# Patient Record
Sex: FEMALE | URBAN - METROPOLITAN AREA
[De-identification: names, ages, dates, MRNs, and addresses within clinical notes are randomized per-mention and may not be internally consistent; named-entity substitution may affect disease eponyms.]

---

## 2023-02-14 ENCOUNTER — WEB ENCOUNTER (OUTPATIENT)
Dept: URBAN - METROPOLITAN AREA CLINIC 12 | Facility: CLINIC | Age: 33
End: 2023-02-14

## 2023-02-16 ENCOUNTER — DASHBOARD ENCOUNTERS (OUTPATIENT)
Age: 33
End: 2023-02-16

## 2023-02-16 ENCOUNTER — OFFICE VISIT (OUTPATIENT)
Dept: URBAN - METROPOLITAN AREA CLINIC 12 | Facility: CLINIC | Age: 33
End: 2023-02-16
Payer: COMMERCIAL

## 2023-02-16 VITALS
SYSTOLIC BLOOD PRESSURE: 126 MMHG | HEART RATE: 77 BPM | BODY MASS INDEX: 30.58 KG/M2 | HEIGHT: 63 IN | DIASTOLIC BLOOD PRESSURE: 77 MMHG | WEIGHT: 172.6 LBS

## 2023-02-16 DIAGNOSIS — R19.7 DIARRHEA: ICD-10-CM

## 2023-02-16 DIAGNOSIS — K60.2 ANAL FISSURE: ICD-10-CM

## 2023-02-16 DIAGNOSIS — R10.33 PERIUMBILICAL ABDOMINAL PAIN: ICD-10-CM

## 2023-02-16 PROCEDURE — 99204 OFFICE O/P NEW MOD 45 MIN: CPT | Performed by: INTERNAL MEDICINE

## 2023-02-16 RX ORDER — LEVOTHYROXINE SODIUM 75 UG/1
1 TABLET IN THE MORNING ON AN EMPTY STOMACH TABLET ORAL ONCE A DAY
Status: ACTIVE | COMMUNITY

## 2023-02-16 NOTE — PHYSICAL EXAM GASTROINTESTINAL
Abdomen , soft, nontender, nondistended , no guarding or rigidity , no masses palpable , normal bowel sounds , Liver and Spleen,  no hepatosplenomegaly , liver nontender rectal exam revealed anterior wall fissure

## 2023-02-16 NOTE — HPI-TODAY'S VISIT:
32-year-old female presented today for evaluation of recurrent cramping diarrhea perianal fissure.  Patient has the symptoms for years recently has increased in frequency she has recurrent perianal fissure.  When she was 16 years old had extensive GI work-up as a child in [facility] she had EGD colonoscopy capsule endoscopy was unremarkable she has positive ASCA serology.  In 2021 after her childbirth had recurrent diarrhea abdominal pain.  Concerned about Crohn disease.  No blood in the stool she has nonhealing recurrent anal fissure which is exacerbated recently.

## 2023-02-17 ENCOUNTER — LAB OUTSIDE AN ENCOUNTER (OUTPATIENT)
Dept: URBAN - METROPOLITAN AREA CLINIC 23 | Facility: CLINIC | Age: 33
End: 2023-02-17

## 2023-02-20 LAB
ABSOLUTE BASOPHILS: 57
ABSOLUTE EOSINOPHILS: 98
ABSOLUTE LYMPHOCYTES: 2050
ABSOLUTE MONOCYTES: 508
ABSOLUTE NEUTROPHILS: 5486
ALMOND (F20) IGE: <0.1
BASOPHILS: 0.7
C-REACTIVE PROTEIN, QUANT: 1
CASHEW NUT (F202) IGE: <0.1
CLASS: 0
CODFISH (F3) IGE: <0.1
COW'S MILK (F2) IGE: <0.1
EGG WHITE (F1) IGE: <0.1
EOSINOPHILS: 1.2
FERRITIN, SERUM: 28
HAZELNUT (F17) IGE: <0.1
HEMATOCRIT: 38.8
HEMOGLOBIN: 13.3
IMMUNOGLOBULIN A: 84
INTERPRETATION: (no result)
INTERPRETATION: (no result)
IRON BIND.CAP.(TIBC): 303
IRON SATURATION: 26
IRON: 80
LYMPHOCYTES: 25
MCH: 30
MCHC: 34.3
MCV: 87.6
MONOCYTES: 6.2
MPV: 10.4
NEUTROPHILS: 66.9
PEANUT (F13) IGE: <0.1
PLATELET COUNT: 244
RDW: 12
RED BLOOD CELL COUNT: 4.43
SALMON (F41) IGE: <0.1
SCALLOP (F338) IGE: <0.1
SESAME SEED (F10) IGE: <0.1
SHRIMP (F24) IGE: <0.1
SOYBEAN (F14) IGE: <0.1
TISSUE TRANSGLUTAMINASE AB, IGA: <1
TUNA (F40) IGE: <0.1
VITAMIN B12: 332
VITAMIN D,25-OH,TOTAL,IA: 37
WALNUT (F256) IGE: <0.1
WHEAT (F4) IGE: <0.1
WHITE BLOOD CELL COUNT: 8.2

## 2023-02-21 ENCOUNTER — WEB ENCOUNTER (OUTPATIENT)
Dept: URBAN - METROPOLITAN AREA CLINIC 23 | Facility: CLINIC | Age: 33
End: 2023-02-21

## 2023-02-21 LAB
ADENOVIRUS F 40/41: NOT DETECTED
CALPROTECTIN, STOOL - QDX: (no result)
CAMPYLOBACTER: NOT DETECTED
CLOSTRIDIUM DIFFICILE: NOT DETECTED
CRYPTOSPORIDIUM: NOT DETECTED
CYCLOSPORA CAYETANESIS: NOT DETECTED
ENTAMOEBA HISTOLYTICA: NOT DETECTED
ENTAMOEBA HISTOLYTICA: NOT DETECTED
ENTEROAGGREGATIVE E.COLI: NOT DETECTED
ENTEROTOXIGENIC E.COLI: NOT DETECTED
ESCHERICHIA COLI O157: NOT DETECTED
GIARDIA LAMBIA: NOT DETECTED
NOROVIRUS GI/GII: NOT DETECTED
NOROVIRUS GI/GII: NOT DETECTED
ROTAVIRUS A: NOT DETECTED
SHIGA-LIKE TOXIN PRODUCING E.COLI: NOT DETECTED
SHIGELLA SPP. / ENTEROINVASIVE E.COLI: NOT DETECTED
VIBRIO CHOLERAE: NOT DETECTED
VIBRIO PARAHAEMOLYTICUS: NOT DETECTED
VIBRIO SPP.: NOT DETECTED
YERSINIA ENTEROCOLITICA: NOT DETECTED
YERSINIA ENTEROCOLITICA: NOT DETECTED

## 2023-03-14 ENCOUNTER — WEB ENCOUNTER (OUTPATIENT)
Dept: URBAN - METROPOLITAN AREA CLINIC 12 | Facility: CLINIC | Age: 33
End: 2023-03-14

## 2023-03-15 ENCOUNTER — WEB ENCOUNTER (OUTPATIENT)
Dept: URBAN - METROPOLITAN AREA CLINIC 12 | Facility: CLINIC | Age: 33
End: 2023-03-15